# Patient Record
Sex: FEMALE | Race: WHITE | Employment: UNEMPLOYED | URBAN - NONMETROPOLITAN AREA
[De-identification: names, ages, dates, MRNs, and addresses within clinical notes are randomized per-mention and may not be internally consistent; named-entity substitution may affect disease eponyms.]

---

## 2020-03-15 ENCOUNTER — HOSPITAL ENCOUNTER (EMERGENCY)
Facility: HOSPITAL | Age: 30
Discharge: HOME | End: 2020-03-15
Attending: EMERGENCY MEDICINE | Admitting: EMERGENCY MEDICINE
Payer: COMMERCIAL

## 2020-03-15 VITALS
DIASTOLIC BLOOD PRESSURE: 72 MMHG | SYSTOLIC BLOOD PRESSURE: 116 MMHG | WEIGHT: 145.72 LBS | HEART RATE: 86 BPM | TEMPERATURE: 98.2 F | RESPIRATION RATE: 16 BRPM | OXYGEN SATURATION: 99 %

## 2020-03-15 DIAGNOSIS — E16.0 HYPOGLYCEMIA DUE TO INSULIN: Primary | ICD-10-CM

## 2020-03-15 DIAGNOSIS — T38.3X5A HYPOGLYCEMIA DUE TO INSULIN: Primary | ICD-10-CM

## 2020-03-15 DIAGNOSIS — E11.9 DIABETES MELLITUS (HCC): ICD-10-CM

## 2020-03-15 LAB
ALBUMIN SERPL BCP-MCNC: 4.1 G/DL (ref 3.5–5)
ALP SERPL-CCNC: 89 U/L (ref 46–116)
ALT SERPL W P-5'-P-CCNC: 47 U/L (ref 12–78)
AMPHETAMINES SERPL QL SCN: NEGATIVE
ANION GAP SERPL CALCULATED.3IONS-SCNC: 10 MMOL/L (ref 4–13)
AST SERPL W P-5'-P-CCNC: 33 U/L (ref 5–45)
BARBITURATES UR QL: NEGATIVE
BASOPHILS # BLD AUTO: 0.05 THOUSANDS/ΜL (ref 0–0.1)
BASOPHILS NFR BLD AUTO: 1 % (ref 0–1)
BENZODIAZ UR QL: NEGATIVE
BILIRUB SERPL-MCNC: 0.5 MG/DL (ref 0.2–1)
BILIRUB UR QL STRIP: NEGATIVE
BUN SERPL-MCNC: 8 MG/DL (ref 5–25)
CALCIUM SERPL-MCNC: 9.4 MG/DL (ref 8.3–10.1)
CHLORIDE SERPL-SCNC: 101 MMOL/L (ref 100–108)
CLARITY UR: CLEAR
CO2 SERPL-SCNC: 27 MMOL/L (ref 21–32)
COCAINE UR QL: NEGATIVE
COLOR UR: ABNORMAL
CREAT SERPL-MCNC: 0.7 MG/DL (ref 0.6–1.3)
EOSINOPHIL # BLD AUTO: 0.06 THOUSAND/ΜL (ref 0–0.61)
EOSINOPHIL NFR BLD AUTO: 1 % (ref 0–6)
ERYTHROCYTE [DISTWIDTH] IN BLOOD BY AUTOMATED COUNT: 11.3 % (ref 11.6–15.1)
ETHANOL SERPL-MCNC: <3 MG/DL (ref 0–3)
EXT PREG TEST URINE: NEGATIVE
EXT. CONTROL ED NAV: NORMAL
GFR SERPL CREATININE-BSD FRML MDRD: 117 ML/MIN/1.73SQ M
GLUCOSE SERPL-MCNC: 265 MG/DL (ref 65–140)
GLUCOSE SERPL-MCNC: 282 MG/DL (ref 65–140)
GLUCOSE UR STRIP-MCNC: ABNORMAL MG/DL
HCT VFR BLD AUTO: 45.3 % (ref 34.8–46.1)
HGB BLD-MCNC: 15.4 G/DL (ref 11.5–15.4)
HGB UR QL STRIP.AUTO: NEGATIVE
IMM GRANULOCYTES # BLD AUTO: 0.02 THOUSAND/UL (ref 0–0.2)
IMM GRANULOCYTES NFR BLD AUTO: 0 % (ref 0–2)
KETONES UR STRIP-MCNC: NEGATIVE MG/DL
LEUKOCYTE ESTERASE UR QL STRIP: NEGATIVE
LYMPHOCYTES # BLD AUTO: 1.45 THOUSANDS/ΜL (ref 0.6–4.47)
LYMPHOCYTES NFR BLD AUTO: 23 % (ref 14–44)
MAGNESIUM SERPL-MCNC: 1.8 MG/DL (ref 1.6–2.6)
MCH RBC QN AUTO: 33.3 PG (ref 26.8–34.3)
MCHC RBC AUTO-ENTMCNC: 34 G/DL (ref 31.4–37.4)
MCV RBC AUTO: 98 FL (ref 82–98)
METHADONE UR QL: NEGATIVE
MONOCYTES # BLD AUTO: 0.44 THOUSAND/ΜL (ref 0.17–1.22)
MONOCYTES NFR BLD AUTO: 7 % (ref 4–12)
NEUTROPHILS # BLD AUTO: 4.43 THOUSANDS/ΜL (ref 1.85–7.62)
NEUTS SEG NFR BLD AUTO: 68 % (ref 43–75)
NITRITE UR QL STRIP: NEGATIVE
NRBC BLD AUTO-RTO: 0 /100 WBCS
OPIATES UR QL SCN: NEGATIVE
PCP UR QL: NEGATIVE
PH UR STRIP.AUTO: 7 [PH]
PLATELET # BLD AUTO: 400 THOUSANDS/UL (ref 149–390)
PMV BLD AUTO: 9.2 FL (ref 8.9–12.7)
POTASSIUM SERPL-SCNC: 4.1 MMOL/L (ref 3.5–5.3)
PROT SERPL-MCNC: 7.1 G/DL (ref 6.4–8.2)
PROT UR STRIP-MCNC: NEGATIVE MG/DL
RBC # BLD AUTO: 4.62 MILLION/UL (ref 3.81–5.12)
SODIUM SERPL-SCNC: 138 MMOL/L (ref 136–145)
SP GR UR STRIP.AUTO: <=1.005 (ref 1–1.03)
THC UR QL: NEGATIVE
TROPONIN I SERPL-MCNC: <0.02 NG/ML
TSH SERPL DL<=0.05 MIU/L-ACNC: 0.95 UIU/ML (ref 0.36–3.74)
UROBILINOGEN UR QL STRIP.AUTO: 0.2 E.U./DL
WBC # BLD AUTO: 6.45 THOUSAND/UL (ref 4.31–10.16)

## 2020-03-15 PROCEDURE — 80053 COMPREHEN METABOLIC PANEL: CPT | Performed by: PHYSICIAN ASSISTANT

## 2020-03-15 PROCEDURE — 36415 COLL VENOUS BLD VENIPUNCTURE: CPT | Performed by: PHYSICIAN ASSISTANT

## 2020-03-15 PROCEDURE — 99284 EMERGENCY DEPT VISIT MOD MDM: CPT | Performed by: PHYSICIAN ASSISTANT

## 2020-03-15 PROCEDURE — 96374 THER/PROPH/DIAG INJ IV PUSH: CPT

## 2020-03-15 PROCEDURE — 99284 EMERGENCY DEPT VISIT MOD MDM: CPT

## 2020-03-15 PROCEDURE — 85025 COMPLETE CBC W/AUTO DIFF WBC: CPT | Performed by: PHYSICIAN ASSISTANT

## 2020-03-15 PROCEDURE — 82948 REAGENT STRIP/BLOOD GLUCOSE: CPT

## 2020-03-15 PROCEDURE — 83735 ASSAY OF MAGNESIUM: CPT | Performed by: PHYSICIAN ASSISTANT

## 2020-03-15 PROCEDURE — 80320 DRUG SCREEN QUANTALCOHOLS: CPT | Performed by: PHYSICIAN ASSISTANT

## 2020-03-15 PROCEDURE — 81025 URINE PREGNANCY TEST: CPT | Performed by: PHYSICIAN ASSISTANT

## 2020-03-15 PROCEDURE — 81003 URINALYSIS AUTO W/O SCOPE: CPT | Performed by: PHYSICIAN ASSISTANT

## 2020-03-15 PROCEDURE — 84484 ASSAY OF TROPONIN QUANT: CPT | Performed by: PHYSICIAN ASSISTANT

## 2020-03-15 PROCEDURE — 96361 HYDRATE IV INFUSION ADD-ON: CPT

## 2020-03-15 PROCEDURE — 93005 ELECTROCARDIOGRAM TRACING: CPT

## 2020-03-15 PROCEDURE — 80307 DRUG TEST PRSMV CHEM ANLYZR: CPT | Performed by: PHYSICIAN ASSISTANT

## 2020-03-15 PROCEDURE — 84443 ASSAY THYROID STIM HORMONE: CPT | Performed by: PHYSICIAN ASSISTANT

## 2020-03-15 RX ORDER — KETOROLAC TROMETHAMINE 30 MG/ML
15 INJECTION, SOLUTION INTRAMUSCULAR; INTRAVENOUS ONCE
Status: COMPLETED | OUTPATIENT
Start: 2020-03-15 | End: 2020-03-15

## 2020-03-15 RX ADMIN — SODIUM CHLORIDE 1000 ML: 0.9 INJECTION, SOLUTION INTRAVENOUS at 13:31

## 2020-03-15 RX ADMIN — KETOROLAC TROMETHAMINE 15 MG: 30 INJECTION, SOLUTION INTRAMUSCULAR at 13:31

## 2020-03-15 NOTE — ED NOTES
Patient states she feels much better and is ready to go back to [facility]  Patient resting comfortably at this time        Tiffany Fox RN  03/15/20 0316

## 2020-03-15 NOTE — ED NOTES
Patient to be picked up by [facility] around 4 pm  Discharge instructions reviewed with patient  Patient resting comfortably waiting for transportation        Eileen Lopez RN  03/15/20 7715

## 2020-03-15 NOTE — DISCHARGE INSTRUCTIONS
Continue to monitor your blood sugars  Continue home meds as prescribed  Follow up with PCP or return to ER as needed

## 2020-03-15 NOTE — ED NOTES
Called [facility]  Will be calling back in regards to pickup time        Jerlyn Gander, RN  03/15/20 5216

## 2020-03-15 NOTE — ED NOTES
Patient arrived to the emergency department stating that she has been at [facility] for over a week and has been in detox for 9 days  Patient states she was biologically born as a female but is nonbinary and likes to be preferred as "they/them"  Patient states she has a history of anorexia and alcoholism as well as diabetes  Patient states that she takes humulin and lantus for her type 1 diabetes but was not able to wake up to eat her breakfast and she suddenly felt jittery and restless at the facility  Patient was given multiple containers of juice where her sugar went over 200  Patient is currently now alert and oriented all answering questions appropriately        Esha Catalan RN  03/15/20 7642

## 2020-03-15 NOTE — ED PROVIDER NOTES
History  Chief Complaint   Patient presents with    Hypoglycemia - no symptoms     Patient was hypoglycemic upon waking this morning, was given juice, now feeling better     34year old transgender individual presents to the ER via EMS from [facility] facility for evaluation of a low blood sugar  Pt identifies as a nonbinary and does not refer to self as he or she  Pt tells me that when they got up today, they felt week, they didn't feel they could get their words out  Tried getting attention from a good friend and wanted to tell them that they felt their sugar was low  Pt feels like they had decreased responsiveness and wanted to get words out but couldn't  Pt is a know type I diabetic and on a long and short acting insulin regimen  Pt unsure of last HgbA1C; no prior records for review  Blood sugar was checked when pt wasn't feeling well and noted to be 58  Was given juice and pt then felt better  Has complaints of a mild headache at this time  Otherwise has been in usual state of health  Pt has h/o alcohol abuse and currently sober x 9 days  At [facility], originally from [facility]  Denies syncope, denies head injury or trauma  Denies fever, chills, cough or congestion  Denies chest pain, SOB  Denies N/V/D, abdominal pain  Pt feels improved at this time and offers no complaints        History provided by:  Patient and EMS personnel   used: No    Hypoglycemia - no symptoms   Chronicity:  New  Diabetic status:  Controlled with insulin  Context: not diet changes, not recent illness and not treatment noncompliance    Associated symptoms: decreased responsiveness, dizziness and speech difficulty    Associated symptoms: no seizures, no shortness of breath, no tremors, no visual change, no vomiting and no weakness    Risk factors: alcohol abuse    Risk factors: no drug use, no pregnancy and no recent surgery        None       Past Medical History:   Diagnosis Date    Alcohol abuse        History reviewed  No pertinent surgical history  History reviewed  No pertinent family history  I have reviewed and agree with the history as documented  E-Cigarette/Vaping     E-Cigarette/Vaping Substances     Social History     Tobacco Use    Smoking status: Current Every Day Smoker     Types: Cigarettes    Smokeless tobacco: Never Used   Substance Use Topics    Alcohol use: Yes     Frequency: 4 or more times a week     Binge frequency: Daily or almost daily     Comment: sober since 03/04/2020    Drug use: Not Currently       Review of Systems   Constitutional: Positive for decreased responsiveness  Negative for chills, fatigue and fever  HENT: Negative  Negative for congestion, rhinorrhea and sore throat  Eyes: Negative  Negative for visual disturbance  Respiratory: Negative  Negative for cough, shortness of breath and wheezing  Cardiovascular: Negative  Negative for chest pain, palpitations and leg swelling  Gastrointestinal: Negative  Negative for abdominal pain, constipation, diarrhea, nausea and vomiting  Genitourinary: Negative  Negative for dysuria, flank pain, frequency and hematuria  Musculoskeletal: Negative  Negative for back pain and myalgias  Skin: Negative  Negative for rash  Neurological: Positive for dizziness, speech difficulty, light-headedness and headaches  Negative for tremors, seizures and weakness  Psychiatric/Behavioral: Negative  Negative for confusion, dysphoric mood and suicidal ideas  All other systems reviewed and are negative  Physical Exam  Physical Exam   Constitutional: She is oriented to person, place, and time  She appears well-developed and well-nourished  Non-toxic appearance  No distress  HENT:   Head: Normocephalic and atraumatic     Right Ear: Hearing, tympanic membrane, external ear and ear canal normal    Left Ear: Hearing, tympanic membrane, external ear and ear canal normal    Nose: Nose normal    Mouth/Throat: Uvula is midline, oropharynx is clear and moist and mucous membranes are normal  No oropharyngeal exudate  Eyes: Pupils are equal, round, and reactive to light  Conjunctivae and EOM are normal  No scleral icterus  Neck: Trachea normal, normal range of motion and phonation normal  Neck supple  No tracheal deviation present  Cardiovascular: Normal rate, regular rhythm, normal heart sounds, intact distal pulses and normal pulses  No murmur heard  Pulmonary/Chest: Effort normal and breath sounds normal  No tachypnea  No respiratory distress  She has no wheezes  She has no rhonchi  She has no rales  Chest wall scars c/w prior bilateral breast reduction    Abdominal: Soft  Bowel sounds are normal  She exhibits no distension  There is no tenderness  There is no rebound, no guarding and no CVA tenderness  Musculoskeletal: Normal range of motion  She exhibits no edema or tenderness  Neurological: She is alert and oriented to person, place, and time  She has normal reflexes  No cranial nerve deficit or sensory deficit  She exhibits normal muscle tone  Coordination and gait normal  GCS eye subscore is 4  GCS verbal subscore is 5  GCS motor subscore is 6  Ambulates without assistance  Skin: Skin is warm and dry  Capillary refill takes less than 2 seconds  No rash noted  Old scars present from prior cutting   Psychiatric: She has a normal mood and affect  Her speech is normal and behavior is normal    Nursing note and vitals reviewed        Vital Signs  ED Triage Vitals   Temperature Pulse Respirations Blood Pressure SpO2   03/15/20 1309 03/15/20 1309 03/15/20 1309 03/15/20 1309 03/15/20 1309   98 2 °F (36 8 °C) (!) 110 16 132/86 100 %      Temp Source Heart Rate Source Patient Position - Orthostatic VS BP Location FiO2 (%)   03/15/20 1309 03/15/20 1309 03/15/20 1309 03/15/20 1309 --   Temporal Monitor Lying Right arm       Pain Score       03/15/20 1331       7 Vitals:    03/15/20 1309 03/15/20 1400 03/15/20 1430 03/15/20 1445   BP: 132/86 130/87 121/82 116/72   Pulse: (!) 110 82 93 86   Patient Position - Orthostatic VS: Lying Lying Lying Lying         Visual Acuity  Visual Acuity      Most Recent Value   L Pupil Size (mm)  3   R Pupil Size (mm)  3          ED Medications  Medications   sodium chloride 0 9 % bolus 1,000 mL (0 mL Intravenous Stopped 3/15/20 1424)   ketorolac (TORADOL) injection 15 mg (15 mg Intravenous Given 3/15/20 1331)       Diagnostic Studies  Results Reviewed     Procedure Component Value Units Date/Time    Rapid drug screen, urine [[id number]]  (Normal) Collected:  03/15/20 1426    Lab Status:  Final result Specimen:  Urine, Clean Catch Updated:  03/15/20 1452     Amph/Meth UR Negative     Barbiturate Ur Negative     Benzodiazepine Urine Negative     Cocaine Urine Negative     Methadone Urine Negative     Opiate Urine Negative     PCP Ur Negative     THC Urine Negative    Narrative:       FOR MEDICAL PURPOSES ONLY  IF CONFIRMATION NEEDED PLEASE CONTACT THE LAB WITHIN 5 DAYS      Drug Screen Cutoff Levels:  AMPHETAMINE/METHAMPHETAMINES  1000 ng/mL  BARBITURATES     200 ng/mL  BENZODIAZEPINES     200 ng/mL  COCAINE      300 ng/mL  METHADONE      300 ng/mL  OPIATES      300 ng/mL  PHENCYCLIDINE     25 ng/mL  THC       50 ng/mL      UA (URINE) with reflex to Scope [[id number]]  (Abnormal) Collected:  03/15/20 1426    Lab Status:  Final result Specimen:  Urine, Clean Catch Updated:  03/15/20 1436     Color, UA Light Yellow     Clarity, UA Clear     Specific Gravity, UA <=1 005     pH, UA 7 0     Leukocytes, UA Negative     Nitrite, UA Negative     Protein, UA Negative mg/dl      Glucose,  (1/2%) mg/dl      Ketones, UA Negative mg/dl      Urobilinogen, UA 0 2 E U /dl      Bilirubin, UA Negative     Blood, UA Negative    POCT pregnancy, urine [[id number]]  (Normal) Resulted:  03/15/20 1429    Lab Status:  Final result Updated:  03/15/20 1429 EXT PREG TEST UR (Ref: Negative) negative     Control valid    TSH [[id number]]  (Normal) Collected:  03/15/20 1332    Lab Status:  Final result Specimen:  Blood from Arm, Right Updated:  03/15/20 1406     TSH 3RD GENERATON 0 950 uIU/mL     Narrative:       Patients undergoing fluorescein dye angiography may retain small amounts of fluorescein in the body for 48-72 hours post procedure  Samples containing fluorescein can produce falsely depressed TSH values  If the patient had this procedure,a specimen should be resubmitted post fluorescein clearance        Magnesium [[id number]]  (Normal) Collected:  03/15/20 1332    Lab Status:  Final result Specimen:  Blood from Arm, Right Updated:  03/15/20 1406     Magnesium 1 8 mg/dL     Troponin I [[id number]]  (Normal) Collected:  03/15/20 1332    Lab Status:  Final result Specimen:  Blood from Arm, Right Updated:  03/15/20 1402     Troponin I <0 02 ng/mL     Comprehensive metabolic panel [[id number]]  (Abnormal) Collected:  03/15/20 1332    Lab Status:  Final result Specimen:  Blood from Arm, Right Updated:  03/15/20 1358     Sodium 138 mmol/L      Potassium 4 1 mmol/L      Chloride 101 mmol/L      CO2 27 mmol/L      ANION GAP 10 mmol/L      BUN 8 mg/dL      Creatinine 0 70 mg/dL      Glucose 265 mg/dL      Calcium 9 4 mg/dL      AST 33 U/L      ALT 47 U/L      Alkaline Phosphatase 89 U/L      Total Protein 7 1 g/dL      Albumin 4 1 g/dL      Total Bilirubin 0 50 mg/dL      eGFR 117 ml/min/1 73sq m     Narrative:       Beni guidelines for Chronic Kidney Disease (CKD):     Stage 1 with normal or high GFR (GFR > 90 mL/min/1 73 square meters)    Stage 2 Mild CKD (GFR = 60-89 mL/min/1 73 square meters)    Stage 3A Moderate CKD (GFR = 45-59 mL/min/1 73 square meters)    Stage 3B Moderate CKD (GFR = 30-44 mL/min/1 73 square meters)    Stage 4 Severe CKD (GFR = 15-29 mL/min/1 73 square meters)    Stage 5 End Stage CKD (GFR <15 mL/min/1 73 square meters)  Note: GFR calculation is accurate only with a steady state creatinine    Ethanol [[id number]]  (Normal) Collected:  03/15/20 1332    Lab Status:  Final result Specimen:  Blood from Arm, Right Updated:  03/15/20 1354     Ethanol Lvl <3 mg/dL     CBC and differential [[id number]]  (Abnormal) Collected:  03/15/20 1332    Lab Status:  Final result Specimen:  Blood from Arm, Right Updated:  03/15/20 1339     WBC 6 45 Thousand/uL      RBC 4 62 Million/uL      Hemoglobin 15 4 g/dL      Hematocrit 45 3 %      MCV 98 fL      MCH 33 3 pg      MCHC 34 0 g/dL      RDW 11 3 %      MPV 9 2 fL      Platelets 427 Thousands/uL      nRBC 0 /100 WBCs      Neutrophils Relative 68 %      Immat GRANS % 0 %      Lymphocytes Relative 23 %      Monocytes Relative 7 %      Eosinophils Relative 1 %      Basophils Relative 1 %      Neutrophils Absolute 4 43 Thousands/µL      Immature Grans Absolute 0 02 Thousand/uL      Lymphocytes Absolute 1 45 Thousands/µL      Monocytes Absolute 0 44 Thousand/µL      Eosinophils Absolute 0 06 Thousand/µL      Basophils Absolute 0 05 Thousands/µL     Fingerstick Glucose (POCT) [[id number]]  (Abnormal) Collected:  03/15/20 1312    Lab Status:  Final result Updated:  03/15/20 1313     POC Glucose 282 mg/dl                  No orders to display              Procedures  ECG 12 Lead Documentation Only  Date/Time: 3/15/2020 1:15 PM  Performed by: Paulett Duane, PA-C  Authorized by: Paulett Duane, PA-C     Indications / Diagnosis:  Hypoglyecmia  ECG reviewed by me, the ED Provider: yes    Patient location:  ED  Previous ECG:     Previous ECG:  Unavailable    Comparison to cardiac monitor: Yes    Interpretation:     Interpretation: normal    Rate:     ECG rate:  95    ECG rate assessment: normal    Rhythm:     Rhythm: sinus rhythm    Ectopy:     Ectopy: none    QRS:     QRS axis:  Normal    QRS intervals:  Normal  Conduction:     Conduction: normal    ST segments:     ST segments:  Normal  T waves: T waves: normal    Comments:      , QRS 80, QT/QTc 362/454; no acute ischemic changes, no prior EKG for comparison  ED Course  ED Course as of Mar 15 1548   Sun Mar 15, 2020   1315 Pt known diabetic on insulin therapy  Hypoglycemia and symptoms resolved at this time  Will check EKG, labs, urine and monitor  Exam benign, NIH=0, vitals stable  Anticipate discharge back to [address] [facility] facility after evaluation  1320 POC Glucose(!): 282   1345 WBC: 6 45   1345 Hemoglobin: 15 4   1345 Platelet Count(!): 927   1354 MEDICAL ALCOHOL: <3   1359 Glucose, Random(!): 265   1359 Creatinine: 0 70   1359 BUN: 8   1359 Sodium: 138   1359 Potassium: 4 1   1359 Chloride: 101   1359 CO2: 27   1359 Anion Gap: 10   1359 Calcium: 9 4   1359 AST: 33   1359 ALT: 47   1359 Alkaline Phosphatase: 89   1359 Total Protein: 7 1   1359 Albumin: 4 1   1359 TOTAL BILIRUBIN: 0 50   1359 eGFR: 117   1403 Troponin I: <0 02   1411 TSH 3RD GENERATON: 0 950   1411 Magnesium: 1 8   1415 Pt reports feeling improved and offers no complaints  1429 PREGNANCY TEST URINE: negative   1438 Color, UA: Light Yellow   1438 Clarity, UA: Clear   1438 SL AMB SPECIFIC GRAVITY_URINE: <=1 005   1438 pH, UA: 7 0   1438 Leukocytes, UA: Negative   1438 Nitrite, UA: Negative   1438 POCT URINE PROTEIN: Negative   1438 Glucose, UA(!): 500 (1/2%)   1438 Ketones, UA: Negative   1438 SL AMB POCT UROBILINOGEN: 0 2   1438 Bilirubin, UA: Negative   1438 Blood, UA: Negative   1450 Pt updated on all results  Continues to feel improved and offer no complaints  Blood glucose has remained stable and pt wishes to return to [address] [facility]  Pt tolerating PO    Pt is AAOx3 with normal exam      1453 AMPH/METH: Negative   1453 BARBITURATE URINE: Negative   1453 BENZODIAZEPINE URINE: Negative   1453 COCAINE URINE: Negative   1453 METHADONE URINE: Negative   1453 OPIATE URINE: Negative   1453 PHENCYCLIDINE URINE: Negative   1453 THC URINE: Negative   1503 Pt waiting to be picked up; [facility] contacted  Discussed diabetic diet recommendations  Continue home meds and insulin as prescribed  Continue to monitor blood sugars  Strict return precautions outlined  Advised outpatient follow up with PCP or return to ER for change in condition as outlined  Pt verbalized understanding and had no further questions  HEART Risk Score      Most Recent Value   Heart Score Risk Calculator   History  0 Filed at: 03/15/2020 1352   ECG  0 Filed at: 03/15/2020 1352   Age  0 Filed at: 03/15/2020 1352   Risk Factors  1 Filed at: 03/15/2020 1352   Troponin  0 Filed at: 03/15/2020 1352   HEART Score  1 Filed at: 03/15/2020 1352          Stroke Assessment     Row Name 03/15/20 1310             NIH Stroke Scale    Interval  Baseline      Level of Consciousness (1a )  0      LOC Questions (1b )  0      LOC Commands (1c )  0      Best Gaze (2 )  0      Visual (3 )  0      Facial Palsy (4 )  0      Motor Arm, Left (5a )  0      Motor Arm, Right (5b )  0      Motor Leg, Left (6a )  0      Motor Leg, Right (6b )  0      Limb Ataxia (7 )  0      Sensory (8 )  0      Best Language (9 )  0      Dysarthria (10 )  0      Extinction and Inattention (11 ) (Formerly Neglect)  0      Total  0          First Filed Value   TPA Decision  Patient not a TPA candidate  Patient is not a candidate options  Symptoms resolved/clearly non disabling                            MDM  Number of Diagnoses or Management Options  Diabetes mellitus ([facility] [address] ): established and worsening  Hypoglycemia due to insulin: new and requires workup        Disposition  Final diagnoses:   Hypoglycemia due to insulin   Diabetes mellitus ([facility] [address] )     Time reflects when diagnosis was documented in both MDM as applicable and the Disposition within this note     Time User Action Codes Description Comment    3/15/2020  2:51 PM Sonny Yeung Add [E16 0,  T38 3X5A] Hypoglycemia due to insulin     3/15/2020  2:51 PM Jacquelyn Rojas Add [E11 9] Diabetes mellitus [facility])       ED Disposition     ED Disposition Condition Date/Time Comment    Discharge Stable Sun Mar 15, 2020  2:51 PM Lauri Armenta discharge to home/self care  Follow-up Information     Follow up With Specialties Details Why Contact Info Additional Information    [facility] Emergency Medicine  As needed [address] [facility], [address], [address]          Patient's Medications    No medications on file     No discharge procedures on file      PDMP Review     None          ED Provider  Electronically Signed by           Richard Watt PA-C  03/15/20 3341

## 2020-03-15 NOTE — ED NOTES
Patient unable to provide urine specimen at this time  Patient aware  IV site assessed  Fluids running at this time  Patient resting comfortably watching television at this time        Christiano Shepherd RN  03/15/20 6886

## 2020-03-16 LAB
ATRIAL RATE: 95 BPM
P AXIS: 69 DEGREES
PR INTERVAL: 120 MS
QRS AXIS: 52 DEGREES
QRSD INTERVAL: 80 MS
QT INTERVAL: 362 MS
QTC INTERVAL: 454 MS
T WAVE AXIS: 51 DEGREES
VENTRICULAR RATE: 95 BPM

## 2020-03-16 PROCEDURE — 93010 ELECTROCARDIOGRAM REPORT: CPT | Performed by: INTERNAL MEDICINE
